# Patient Record
Sex: MALE | Race: WHITE | NOT HISPANIC OR LATINO | Employment: FULL TIME | URBAN - METROPOLITAN AREA
[De-identification: names, ages, dates, MRNs, and addresses within clinical notes are randomized per-mention and may not be internally consistent; named-entity substitution may affect disease eponyms.]

---
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
## 2021-05-09 ENCOUNTER — OFFICE VISIT (OUTPATIENT)
Dept: URGENT CARE | Facility: CLINIC | Age: 21
End: 2021-05-09
Payer: COMMERCIAL

## 2021-05-09 VITALS
BODY MASS INDEX: 24.12 KG/M2 | SYSTOLIC BLOOD PRESSURE: 130 MMHG | HEART RATE: 92 BPM | DIASTOLIC BLOOD PRESSURE: 76 MMHG | TEMPERATURE: 99 F | HEIGHT: 73 IN | RESPIRATION RATE: 18 BRPM | WEIGHT: 182 LBS | OXYGEN SATURATION: 100 %

## 2021-05-09 DIAGNOSIS — J02.0 STREP PHARYNGITIS: Primary | ICD-10-CM

## 2021-05-09 DIAGNOSIS — J02.9 SORE THROAT: ICD-10-CM

## 2021-05-09 LAB
CTP QC/QA: YES
MOLECULAR STREP A: POSITIVE

## 2021-05-09 PROCEDURE — 96372 PR INJECTION,THERAP/PROPH/DIAG2ST, IM OR SUBCUT: ICD-10-PCS | Mod: S$GLB,,, | Performed by: PHYSICIAN ASSISTANT

## 2021-05-09 PROCEDURE — 3008F BODY MASS INDEX DOCD: CPT | Mod: CPTII,S$GLB,, | Performed by: PHYSICIAN ASSISTANT

## 2021-05-09 PROCEDURE — 1125F PR PAIN SEVERITY QUANTIFIED, PAIN PRESENT: ICD-10-PCS | Mod: S$GLB,,, | Performed by: PHYSICIAN ASSISTANT

## 2021-05-09 PROCEDURE — 1125F AMNT PAIN NOTED PAIN PRSNT: CPT | Mod: S$GLB,,, | Performed by: PHYSICIAN ASSISTANT

## 2021-05-09 PROCEDURE — 96372 THER/PROPH/DIAG INJ SC/IM: CPT | Mod: S$GLB,,, | Performed by: PHYSICIAN ASSISTANT

## 2021-05-09 PROCEDURE — 87651 STREP A DNA AMP PROBE: CPT | Mod: QW,S$GLB,, | Performed by: PHYSICIAN ASSISTANT

## 2021-05-09 PROCEDURE — 3008F PR BODY MASS INDEX (BMI) DOCUMENTED: ICD-10-PCS | Mod: CPTII,S$GLB,, | Performed by: PHYSICIAN ASSISTANT

## 2021-05-09 PROCEDURE — 99214 PR OFFICE/OUTPT VISIT, EST, LEVL IV, 30-39 MIN: ICD-10-PCS | Mod: 25,S$GLB,, | Performed by: PHYSICIAN ASSISTANT

## 2021-05-09 PROCEDURE — 99214 OFFICE O/P EST MOD 30 MIN: CPT | Mod: 25,S$GLB,, | Performed by: PHYSICIAN ASSISTANT

## 2021-05-09 PROCEDURE — 87651 POCT STREP A MOLECULAR: ICD-10-PCS | Mod: QW,S$GLB,, | Performed by: PHYSICIAN ASSISTANT

## 2021-05-09 RX ORDER — AMOXICILLIN 400 MG/5ML
499 POWDER, FOR SUSPENSION ORAL 2 TIMES DAILY
Qty: 124 ML | Refills: 0 | Status: SHIPPED | OUTPATIENT
Start: 2021-05-09 | End: 2021-05-19

## 2021-05-09 RX ORDER — BETAMETHASONE SODIUM PHOSPHATE AND BETAMETHASONE ACETATE 3; 3 MG/ML; MG/ML
6 INJECTION, SUSPENSION INTRA-ARTICULAR; INTRALESIONAL; INTRAMUSCULAR; SOFT TISSUE
Status: COMPLETED | OUTPATIENT
Start: 2021-05-09 | End: 2021-05-09

## 2021-05-09 RX ADMIN — BETAMETHASONE SODIUM PHOSPHATE AND BETAMETHASONE ACETATE 6 MG: 3; 3 INJECTION, SUSPENSION INTRA-ARTICULAR; INTRALESIONAL; INTRAMUSCULAR; SOFT TISSUE at 03:05

## 2021-05-12 ENCOUNTER — TELEPHONE (OUTPATIENT)
Dept: URGENT CARE | Facility: CLINIC | Age: 21
End: 2021-05-12

## 2021-05-15 ENCOUNTER — OFFICE VISIT (OUTPATIENT)
Dept: URGENT CARE | Facility: CLINIC | Age: 21
End: 2021-05-15
Payer: COMMERCIAL

## 2021-05-15 VITALS
HEART RATE: 72 BPM | HEIGHT: 73 IN | DIASTOLIC BLOOD PRESSURE: 76 MMHG | BODY MASS INDEX: 25.84 KG/M2 | RESPIRATION RATE: 16 BRPM | WEIGHT: 195 LBS | SYSTOLIC BLOOD PRESSURE: 117 MMHG | TEMPERATURE: 99 F | OXYGEN SATURATION: 98 %

## 2021-05-15 DIAGNOSIS — J02.0 STREP PHARYNGITIS: Primary | ICD-10-CM

## 2021-05-15 PROCEDURE — 3008F PR BODY MASS INDEX (BMI) DOCUMENTED: ICD-10-PCS | Mod: CPTII,S$GLB,, | Performed by: PHYSICIAN ASSISTANT

## 2021-05-15 PROCEDURE — 99214 OFFICE O/P EST MOD 30 MIN: CPT | Mod: S$GLB,,, | Performed by: PHYSICIAN ASSISTANT

## 2021-05-15 PROCEDURE — 99214 PR OFFICE/OUTPT VISIT, EST, LEVL IV, 30-39 MIN: ICD-10-PCS | Mod: S$GLB,,, | Performed by: PHYSICIAN ASSISTANT

## 2021-05-15 PROCEDURE — 3008F BODY MASS INDEX DOCD: CPT | Mod: CPTII,S$GLB,, | Performed by: PHYSICIAN ASSISTANT

## 2021-05-15 RX ORDER — AMOXICILLIN 400 MG/5ML
800 POWDER, FOR SUSPENSION ORAL 2 TIMES DAILY
Qty: 140 ML | Refills: 0 | Status: SHIPPED | OUTPATIENT
Start: 2021-05-15 | End: 2021-05-22

## 2021-05-15 RX ORDER — PREDNISONE 20 MG/1
20 TABLET ORAL 2 TIMES DAILY
Qty: 4 TABLET | Refills: 0 | Status: SHIPPED | OUTPATIENT
Start: 2021-05-15 | End: 2021-05-17

## 2021-05-18 ENCOUNTER — TELEPHONE (OUTPATIENT)
Dept: URGENT CARE | Facility: CLINIC | Age: 21
End: 2021-05-18

## 2021-05-25 ENCOUNTER — OFFICE VISIT (OUTPATIENT)
Dept: FAMILY MEDICINE | Facility: CLINIC | Age: 21
End: 2021-05-25
Payer: COMMERCIAL

## 2021-05-25 VITALS
TEMPERATURE: 98 F | HEART RATE: 92 BPM | OXYGEN SATURATION: 96 % | HEIGHT: 73 IN | RESPIRATION RATE: 16 BRPM | SYSTOLIC BLOOD PRESSURE: 110 MMHG | WEIGHT: 183.56 LBS | DIASTOLIC BLOOD PRESSURE: 74 MMHG | BODY MASS INDEX: 24.33 KG/M2

## 2021-05-25 DIAGNOSIS — R53.83 FATIGUE, UNSPECIFIED TYPE: Primary | ICD-10-CM

## 2021-05-25 DIAGNOSIS — J02.9 SORE THROAT: ICD-10-CM

## 2021-05-25 DIAGNOSIS — B35.4 TINEA CORPORIS: ICD-10-CM

## 2021-05-25 LAB
CTP QC/QA: YES
CTP QC/QA: YES
HETEROPH AB SER QL: NEGATIVE
S PYO RRNA THROAT QL PROBE: NEGATIVE

## 2021-05-25 PROCEDURE — 99214 OFFICE O/P EST MOD 30 MIN: CPT | Mod: 25,S$GLB,, | Performed by: NURSE PRACTITIONER

## 2021-05-25 PROCEDURE — 1126F AMNT PAIN NOTED NONE PRSNT: CPT | Mod: S$GLB,,, | Performed by: NURSE PRACTITIONER

## 2021-05-25 PROCEDURE — 3008F BODY MASS INDEX DOCD: CPT | Mod: CPTII,S$GLB,, | Performed by: NURSE PRACTITIONER

## 2021-05-25 PROCEDURE — 87880 POCT RAPID STREP A: ICD-10-PCS | Mod: QW,,, | Performed by: NURSE PRACTITIONER

## 2021-05-25 PROCEDURE — 99214 PR OFFICE/OUTPT VISIT, EST, LEVL IV, 30-39 MIN: ICD-10-PCS | Mod: 25,S$GLB,, | Performed by: NURSE PRACTITIONER

## 2021-05-25 PROCEDURE — 1126F PR PAIN SEVERITY QUANTIFIED, NO PAIN PRESENT: ICD-10-PCS | Mod: S$GLB,,, | Performed by: NURSE PRACTITIONER

## 2021-05-25 PROCEDURE — 87880 STREP A ASSAY W/OPTIC: CPT | Mod: QW,,, | Performed by: NURSE PRACTITIONER

## 2021-05-25 PROCEDURE — 86308 POCT INFECTIOUS MONONUCLEOSIS: ICD-10-PCS | Mod: QW,,, | Performed by: NURSE PRACTITIONER

## 2021-05-25 PROCEDURE — 86308 HETEROPHILE ANTIBODY SCREEN: CPT | Mod: QW,,, | Performed by: NURSE PRACTITIONER

## 2021-05-25 PROCEDURE — 3008F PR BODY MASS INDEX (BMI) DOCUMENTED: ICD-10-PCS | Mod: CPTII,S$GLB,, | Performed by: NURSE PRACTITIONER

## 2021-05-25 RX ORDER — TERBINAFINE HYDROCHLORIDE 250 MG/1
250 TABLET ORAL DAILY
Qty: 21 TABLET | Refills: 0 | Status: SHIPPED | OUTPATIENT
Start: 2021-05-25 | End: 2021-06-24

## 2021-05-25 RX ORDER — AMOXICILLIN AND CLAVULANATE POTASSIUM 875; 125 MG/1; MG/1
1 TABLET, FILM COATED ORAL 2 TIMES DAILY
COMMUNITY
Start: 2021-02-09 | End: 2022-09-22

## 2021-06-22 ENCOUNTER — PATIENT OUTREACH (OUTPATIENT)
Dept: ADMINISTRATIVE | Facility: OTHER | Age: 21
End: 2021-06-22

## 2022-01-31 ENCOUNTER — TELEPHONE (OUTPATIENT)
Dept: FAMILY MEDICINE | Facility: CLINIC | Age: 22
End: 2022-01-31
Payer: COMMERCIAL

## 2022-01-31 RX ORDER — NEOMYCIN/POLYMYXIN B/HYDROCORT 3.5-10K-1
1 SUSPENSION, DROPS(FINAL DOSAGE FORM)(ML) OPHTHALMIC (EYE) EVERY 4 HOURS
Qty: 7.5 ML | Refills: 0 | Status: SHIPPED | OUTPATIENT
Start: 2022-01-31 | End: 2022-09-22

## 2022-01-31 NOTE — TELEPHONE ENCOUNTER
----- Message from Fox EARL Maria Ines sent at 1/31/2022  1:26 PM CST -----  Contact: patient  Type: Needs Medical Advice  Who Called:  patient  Symptoms (please be specific):  possible pink eye  How long has patient had these symptoms:  1 day  Pharmacy name and phone #:    [facility] #[id number] - [address] AT [facility] [address] & [facility] [address] [place] [id number]  Phone: [phone] Fax: [phone]    Best Call Back Number: [phone]  Additional Information: Patient called in and stated his dad told him to call to see if Radha would call some drops in for his pink eye.  Patient stated he woke up this morning and his eye is all red & was crusted over.

## 2022-02-14 ENCOUNTER — OCCUPATIONAL HEALTH (OUTPATIENT)
Dept: URGENT CARE | Facility: CLINIC | Age: 22
End: 2022-02-14

## 2022-02-14 VITALS
HEART RATE: 81 BPM | DIASTOLIC BLOOD PRESSURE: 69 MMHG | WEIGHT: 187 LBS | SYSTOLIC BLOOD PRESSURE: 133 MMHG | HEIGHT: 72 IN | BODY MASS INDEX: 25.33 KG/M2 | TEMPERATURE: 98 F | RESPIRATION RATE: 16 BRPM

## 2022-02-14 DIAGNOSIS — Z02.1 PRE-EMPLOYMENT EXAMINATION: ICD-10-CM

## 2022-02-14 DIAGNOSIS — Z02.83 ENCOUNTER FOR DRUG SCREENING: Primary | ICD-10-CM

## 2022-02-14 PROCEDURE — 80305 OOH NON-DOT DRUG SCREEN: ICD-10-PCS | Mod: S$GLB,,, | Performed by: FAMILY MEDICINE

## 2022-02-14 PROCEDURE — 99499 UNLISTED E&M SERVICE: CPT | Mod: S$GLB,,, | Performed by: FAMILY MEDICINE

## 2022-02-14 PROCEDURE — 99499 PHYSICAL, BASIC COMPLEXITY: ICD-10-PCS | Mod: S$GLB,,, | Performed by: FAMILY MEDICINE

## 2022-02-14 PROCEDURE — 80305 DRUG TEST PRSMV DIR OPT OBS: CPT | Mod: S$GLB,,, | Performed by: FAMILY MEDICINE

## 2022-08-28 ENCOUNTER — OFFICE VISIT (OUTPATIENT)
Dept: URGENT CARE | Facility: CLINIC | Age: 22
End: 2022-08-28
Payer: COMMERCIAL

## 2022-08-28 VITALS
BODY MASS INDEX: 23.86 KG/M2 | RESPIRATION RATE: 18 BRPM | WEIGHT: 180 LBS | HEIGHT: 73 IN | DIASTOLIC BLOOD PRESSURE: 64 MMHG | HEART RATE: 71 BPM | SYSTOLIC BLOOD PRESSURE: 112 MMHG | TEMPERATURE: 98 F | OXYGEN SATURATION: 98 %

## 2022-08-28 DIAGNOSIS — R10.32 DISCOMFORT OF LEFT GROIN: Primary | ICD-10-CM

## 2022-08-28 PROCEDURE — 3078F DIAST BP <80 MM HG: CPT | Mod: CPTII,S$GLB,, | Performed by: PHYSICIAN ASSISTANT

## 2022-08-28 PROCEDURE — 1160F PR REVIEW ALL MEDS BY PRESCRIBER/CLIN PHARMACIST DOCUMENTED: ICD-10-PCS | Mod: CPTII,S$GLB,, | Performed by: PHYSICIAN ASSISTANT

## 2022-08-28 PROCEDURE — 3008F BODY MASS INDEX DOCD: CPT | Mod: CPTII,S$GLB,, | Performed by: PHYSICIAN ASSISTANT

## 2022-08-28 PROCEDURE — 1159F MED LIST DOCD IN RCRD: CPT | Mod: CPTII,S$GLB,, | Performed by: PHYSICIAN ASSISTANT

## 2022-08-28 PROCEDURE — 3074F SYST BP LT 130 MM HG: CPT | Mod: CPTII,S$GLB,, | Performed by: PHYSICIAN ASSISTANT

## 2022-08-28 PROCEDURE — 3078F PR MOST RECENT DIASTOLIC BLOOD PRESSURE < 80 MM HG: ICD-10-PCS | Mod: CPTII,S$GLB,, | Performed by: PHYSICIAN ASSISTANT

## 2022-08-28 PROCEDURE — 1159F PR MEDICATION LIST DOCUMENTED IN MEDICAL RECORD: ICD-10-PCS | Mod: CPTII,S$GLB,, | Performed by: PHYSICIAN ASSISTANT

## 2022-08-28 PROCEDURE — 3074F PR MOST RECENT SYSTOLIC BLOOD PRESSURE < 130 MM HG: ICD-10-PCS | Mod: CPTII,S$GLB,, | Performed by: PHYSICIAN ASSISTANT

## 2022-08-28 PROCEDURE — 99213 PR OFFICE/OUTPT VISIT, EST, LEVL III, 20-29 MIN: ICD-10-PCS | Mod: S$GLB,,, | Performed by: PHYSICIAN ASSISTANT

## 2022-08-28 PROCEDURE — 99213 OFFICE O/P EST LOW 20 MIN: CPT | Mod: S$GLB,,, | Performed by: PHYSICIAN ASSISTANT

## 2022-08-28 PROCEDURE — 3008F PR BODY MASS INDEX (BMI) DOCUMENTED: ICD-10-PCS | Mod: CPTII,S$GLB,, | Performed by: PHYSICIAN ASSISTANT

## 2022-08-28 PROCEDURE — 1160F RVW MEDS BY RX/DR IN RCRD: CPT | Mod: CPTII,S$GLB,, | Performed by: PHYSICIAN ASSISTANT

## 2022-08-28 NOTE — PROGRESS NOTES
"Subjective:       Patient ID: Adriano Johnson is a 21 y.o. male.    Vitals:  height is 6' 1" (1.854 m) and weight is 81.6 kg (180 lb). His oral temperature is 98.2 °F (36.8 °C). His blood pressure is 112/64 and his pulse is 71. His respiration is 18 and oxygen saturation is 98%.     Chief Complaint: Mass    Patient presents to urgent care with possible area of concern around L groin/L buttocks region. Patient has been taking OTC Advil with temporary relief.    Mass  This is a new problem. The current episode started in the past 7 days (1 week). The problem occurs constantly. The problem has been unchanged. Pertinent negatives include no abdominal pain, anorexia, arthralgias, change in bowel habit, chest pain, chills, congestion, coughing, diaphoresis, fatigue, fever, headaches, joint swelling, myalgias, nausea, neck pain, numbness, rash, sore throat, swollen glands, urinary symptoms, vertigo, visual change, vomiting or weakness. Nothing aggravates the symptoms. He has tried NSAIDs for the symptoms. The treatment provided mild relief.     Constitution: Negative for chills, sweating, fatigue and fever.   HENT:  Negative for ear pain, drooling, congestion, sore throat, trouble swallowing and voice change.    Neck: Negative for neck pain, neck stiffness, painful lymph nodes and neck swelling.   Cardiovascular:  Negative for chest pain, leg swelling, palpitations, sob on exertion and passing out.   Eyes:  Negative for eye discharge, eye itching, eye pain, eye redness and eyelid swelling.   Respiratory:  Negative for chest tightness, cough, sputum production, bloody sputum, shortness of breath, stridor and wheezing.    Gastrointestinal:  Negative for abdominal pain, abdominal bloating, nausea, vomiting, constipation, diarrhea and heartburn.   Genitourinary:  Negative for urine decreased.   Musculoskeletal:  Negative for joint pain, joint swelling, abnormal ROM of joint, pain with walking, muscle cramps and muscle ache. "   Skin:  Positive for lesion. Negative for rash, erythema and hives.   Allergic/Immunologic: Negative for hives, itching and sneezing.   Neurological:  Negative for dizziness, history of vertigo, light-headedness, passing out, loss of balance, headaches, altered mental status, loss of consciousness, numbness and seizures.   Hematologic/Lymphatic: Negative for swollen lymph nodes.   Psychiatric/Behavioral:  Negative for altered mental status and nervous/anxious. The patient is not nervous/anxious.      Objective:      Physical Exam   Constitutional: He is oriented to person, place, and time. He appears well-developed. He is cooperative.  Non-toxic appearance. He does not appear ill. No distress.   HENT:   Head: Normocephalic and atraumatic.   Ears:   Right Ear: Hearing, tympanic membrane, external ear and ear canal normal.   Left Ear: Hearing, tympanic membrane, external ear and ear canal normal.   Nose: Nose normal. No mucosal edema, rhinorrhea or nasal deformity. No epistaxis. Right sinus exhibits no maxillary sinus tenderness and no frontal sinus tenderness. Left sinus exhibits no maxillary sinus tenderness and no frontal sinus tenderness.   Mouth/Throat: Uvula is midline, oropharynx is clear and moist and mucous membranes are normal. No trismus in the jaw. Normal dentition. No uvula swelling. No oropharyngeal exudate, posterior oropharyngeal edema or posterior oropharyngeal erythema.   Eyes: Conjunctivae and lids are normal. No scleral icterus.   Neck: Trachea normal and phonation normal. Neck supple. No edema present. No erythema present. No neck rigidity present.   Cardiovascular: Normal rate, regular rhythm, normal heart sounds and normal pulses.   Pulmonary/Chest: Effort normal and breath sounds normal. No respiratory distress. He has no decreased breath sounds. He has no rhonchi.   Abdominal: Normal appearance. Hernia confirmed negative in the left inguinal area.   Genitourinary:    Testes and penis normal.            Musculoskeletal: Normal range of motion.         General: No deformity. Normal range of motion.   Neurological: He is alert and oriented to person, place, and time. He has normal sensation. He exhibits normal muscle tone. Gait normal. Coordination normal.   Skin: Skin is warm, dry, intact, not diaphoretic, not pale, no rash, not vesicular and no abscessed. Capillary refill takes less than 2 seconds. No abrasion, No burn, No bruising, No erythema, No ecchymosis and No petechiae   Psychiatric: His speech is normal and behavior is normal. Judgment and thought content normal.   Nursing note and vitals reviewed.chaperone present (PA on site)         Assessment:       1. Discomfort of left groin          Plan:     Discussed the limitations in the urgent care setting. Advised close follow-up with PCP and/or Specialist for further evaluation as needed. ER precautions given to patient as well. Patient aware, verbalized understanding and agreed with plan of care.    Discomfort of left groin       There are no Patient Instructions on file for this visit.

## 2022-09-22 ENCOUNTER — OFFICE VISIT (OUTPATIENT)
Dept: FAMILY MEDICINE | Facility: CLINIC | Age: 22
End: 2022-09-22
Payer: MEDICAID

## 2022-09-22 VITALS
HEIGHT: 73 IN | HEART RATE: 71 BPM | OXYGEN SATURATION: 99 % | TEMPERATURE: 98 F | DIASTOLIC BLOOD PRESSURE: 76 MMHG | RESPIRATION RATE: 16 BRPM | WEIGHT: 185.44 LBS | SYSTOLIC BLOOD PRESSURE: 116 MMHG | BODY MASS INDEX: 24.58 KG/M2

## 2022-09-22 DIAGNOSIS — R30.0 DYSURIA: ICD-10-CM

## 2022-09-22 DIAGNOSIS — Z00.00 ANNUAL PHYSICAL EXAM: Primary | ICD-10-CM

## 2022-09-22 DIAGNOSIS — R19.09 LEFT GROIN MASS: ICD-10-CM

## 2022-09-22 DIAGNOSIS — R53.83 FATIGUE, UNSPECIFIED TYPE: ICD-10-CM

## 2022-09-22 DIAGNOSIS — Z11.8 SCREENING FOR CHLAMYDIAL DISEASE: ICD-10-CM

## 2022-09-22 DIAGNOSIS — Z00.00 LABORATORY EXAM ORDERED AS PART OF ROUTINE GENERAL MEDICAL EXAMINATION: ICD-10-CM

## 2022-09-22 DIAGNOSIS — R31.9 HEMATURIA, UNSPECIFIED TYPE: ICD-10-CM

## 2022-09-22 PROBLEM — J45.990 EXERCISE-INDUCED ASTHMA: Status: ACTIVE | Noted: 2022-09-22

## 2022-09-22 PROBLEM — K80.20 GALLSTONE: Status: ACTIVE | Noted: 2022-09-22

## 2022-09-22 LAB
ALBUMIN SERPL BCP-MCNC: 4.4 G/DL (ref 3.5–5.2)
ALP SERPL-CCNC: 89 U/L (ref 55–135)
ALT SERPL W/O P-5'-P-CCNC: 27 U/L (ref 10–44)
ANION GAP SERPL CALC-SCNC: 10 MMOL/L (ref 8–16)
AST SERPL-CCNC: 18 U/L (ref 10–40)
BASOPHILS # BLD AUTO: 0.05 K/UL (ref 0–0.2)
BASOPHILS NFR BLD: 0.5 % (ref 0–1.9)
BILIRUB SERPL-MCNC: 0.6 MG/DL (ref 0.1–1)
BILIRUB SERPL-MCNC: NEGATIVE MG/DL
BLOOD URINE, POC: ABNORMAL
BUN SERPL-MCNC: 12 MG/DL (ref 6–20)
CALCIUM SERPL-MCNC: 9.1 MG/DL (ref 8.7–10.5)
CHLORIDE SERPL-SCNC: 106 MMOL/L (ref 95–110)
CHOLEST SERPL-MCNC: 114 MG/DL (ref 120–199)
CHOLEST/HDLC SERPL: 2.4 {RATIO} (ref 2–5)
CLARITY, POC UA: CLEAR
CO2 SERPL-SCNC: 23 MMOL/L (ref 23–29)
COLOR, POC UA: YELLOW
CREAT SERPL-MCNC: 1 MG/DL (ref 0.5–1.4)
DIFFERENTIAL METHOD: NORMAL
EOSINOPHIL # BLD AUTO: 0.2 K/UL (ref 0–0.5)
EOSINOPHIL NFR BLD: 2.4 % (ref 0–8)
ERYTHROCYTE [DISTWIDTH] IN BLOOD BY AUTOMATED COUNT: 12.3 % (ref 11.5–14.5)
EST. GFR  (NO RACE VARIABLE): >60 ML/MIN/1.73 M^2
ESTIMATED AVG GLUCOSE: 91 MG/DL (ref 68–131)
GLUCOSE SERPL-MCNC: 85 MG/DL (ref 70–110)
GLUCOSE UR QL STRIP: NORMAL
HBA1C MFR BLD: 4.8 % (ref 4–5.6)
HCT VFR BLD AUTO: 48 % (ref 40–54)
HDLC SERPL-MCNC: 47 MG/DL (ref 40–75)
HDLC SERPL: 41.2 % (ref 20–50)
HGB BLD-MCNC: 16 G/DL (ref 14–18)
IMM GRANULOCYTES # BLD AUTO: 0.03 K/UL (ref 0–0.04)
IMM GRANULOCYTES NFR BLD AUTO: 0.3 % (ref 0–0.5)
IRON SERPL-MCNC: 93 UG/DL (ref 45–160)
KETONES UR QL STRIP: NEGATIVE
LDLC SERPL CALC-MCNC: 56.6 MG/DL (ref 63–159)
LEUKOCYTE ESTERASE URINE, POC: NEGATIVE
LYMPHOCYTES # BLD AUTO: 2.8 K/UL (ref 1–4.8)
LYMPHOCYTES NFR BLD: 27.8 % (ref 18–48)
MCH RBC QN AUTO: 29.9 PG (ref 27–31)
MCHC RBC AUTO-ENTMCNC: 33.3 G/DL (ref 32–36)
MCV RBC AUTO: 90 FL (ref 82–98)
MONOCYTES # BLD AUTO: 0.8 K/UL (ref 0.3–1)
MONOCYTES NFR BLD: 8 % (ref 4–15)
NEUTROPHILS # BLD AUTO: 6.2 K/UL (ref 1.8–7.7)
NEUTROPHILS NFR BLD: 61 % (ref 38–73)
NITRITE, POC UA: NEGATIVE
NONHDLC SERPL-MCNC: 67 MG/DL
NRBC BLD-RTO: 0 /100 WBC
PH, POC UA: 5
PLATELET # BLD AUTO: 259 K/UL (ref 150–450)
PMV BLD AUTO: 9.9 FL (ref 9.2–12.9)
POTASSIUM SERPL-SCNC: 4.2 MMOL/L (ref 3.5–5.1)
PROT SERPL-MCNC: 6.7 G/DL (ref 6–8.4)
PROTEIN, POC: ABNORMAL
RBC # BLD AUTO: 5.35 M/UL (ref 4.6–6.2)
SATURATED IRON: 24 % (ref 20–50)
SODIUM SERPL-SCNC: 139 MMOL/L (ref 136–145)
SPECIFIC GRAVITY, POC UA: 1.02
TOTAL IRON BINDING CAPACITY: 385 UG/DL (ref 250–450)
TRANSFERRIN SERPL-MCNC: 260 MG/DL (ref 200–375)
TRIGL SERPL-MCNC: 52 MG/DL (ref 30–150)
UROBILINOGEN, POC UA: NORMAL
WBC # BLD AUTO: 10.09 K/UL (ref 3.9–12.7)

## 2022-09-22 PROCEDURE — 81002 URINALYSIS NONAUTO W/O SCOPE: CPT | Mod: S$GLB,,, | Performed by: NURSE PRACTITIONER

## 2022-09-22 PROCEDURE — 3074F PR MOST RECENT SYSTOLIC BLOOD PRESSURE < 130 MM HG: ICD-10-PCS | Mod: CPTII,S$GLB,, | Performed by: NURSE PRACTITIONER

## 2022-09-22 PROCEDURE — 3074F SYST BP LT 130 MM HG: CPT | Mod: CPTII,S$GLB,, | Performed by: NURSE PRACTITIONER

## 2022-09-22 PROCEDURE — 1159F PR MEDICATION LIST DOCUMENTED IN MEDICAL RECORD: ICD-10-PCS | Mod: CPTII,S$GLB,, | Performed by: NURSE PRACTITIONER

## 2022-09-22 PROCEDURE — 3078F PR MOST RECENT DIASTOLIC BLOOD PRESSURE < 80 MM HG: ICD-10-PCS | Mod: CPTII,S$GLB,, | Performed by: NURSE PRACTITIONER

## 2022-09-22 PROCEDURE — 1159F MED LIST DOCD IN RCRD: CPT | Mod: CPTII,S$GLB,, | Performed by: NURSE PRACTITIONER

## 2022-09-22 PROCEDURE — 36415 COLL VENOUS BLD VENIPUNCTURE: CPT | Mod: S$GLB,,, | Performed by: NURSE PRACTITIONER

## 2022-09-22 PROCEDURE — 3078F DIAST BP <80 MM HG: CPT | Mod: CPTII,S$GLB,, | Performed by: NURSE PRACTITIONER

## 2022-09-22 PROCEDURE — 84443 ASSAY THYROID STIM HORMONE: CPT | Performed by: NURSE PRACTITIONER

## 2022-09-22 PROCEDURE — 36415 PR COLLECTION VENOUS BLOOD,VENIPUNCTURE: ICD-10-PCS | Mod: S$GLB,,, | Performed by: NURSE PRACTITIONER

## 2022-09-22 PROCEDURE — 80053 COMPREHEN METABOLIC PANEL: CPT | Performed by: NURSE PRACTITIONER

## 2022-09-22 PROCEDURE — 85025 COMPLETE CBC W/AUTO DIFF WBC: CPT | Performed by: NURSE PRACTITIONER

## 2022-09-22 PROCEDURE — 1160F PR REVIEW ALL MEDS BY PRESCRIBER/CLIN PHARMACIST DOCUMENTED: ICD-10-PCS | Mod: CPTII,S$GLB,, | Performed by: NURSE PRACTITIONER

## 2022-09-22 PROCEDURE — 99395 PR PREVENTIVE VISIT,EST,18-39: ICD-10-PCS | Mod: S$GLB,,, | Performed by: NURSE PRACTITIONER

## 2022-09-22 PROCEDURE — 3008F BODY MASS INDEX DOCD: CPT | Mod: CPTII,S$GLB,, | Performed by: NURSE PRACTITIONER

## 2022-09-22 PROCEDURE — 3008F PR BODY MASS INDEX (BMI) DOCUMENTED: ICD-10-PCS | Mod: CPTII,S$GLB,, | Performed by: NURSE PRACTITIONER

## 2022-09-22 PROCEDURE — 3044F HG A1C LEVEL LT 7.0%: CPT | Mod: CPTII,S$GLB,, | Performed by: NURSE PRACTITIONER

## 2022-09-22 PROCEDURE — 99395 PREV VISIT EST AGE 18-39: CPT | Mod: S$GLB,,, | Performed by: NURSE PRACTITIONER

## 2022-09-22 PROCEDURE — 87591 N.GONORRHOEAE DNA AMP PROB: CPT | Performed by: NURSE PRACTITIONER

## 2022-09-22 PROCEDURE — 81002 POCT URINE DIPSTICK WITHOUT MICROSCOPE: ICD-10-PCS | Mod: S$GLB,,, | Performed by: NURSE PRACTITIONER

## 2022-09-22 PROCEDURE — 80061 LIPID PANEL: CPT | Performed by: NURSE PRACTITIONER

## 2022-09-22 PROCEDURE — 87491 CHLMYD TRACH DNA AMP PROBE: CPT | Performed by: NURSE PRACTITIONER

## 2022-09-22 PROCEDURE — 1160F RVW MEDS BY RX/DR IN RCRD: CPT | Mod: CPTII,S$GLB,, | Performed by: NURSE PRACTITIONER

## 2022-09-22 PROCEDURE — 87086 URINE CULTURE/COLONY COUNT: CPT | Performed by: NURSE PRACTITIONER

## 2022-09-22 PROCEDURE — 83036 HEMOGLOBIN GLYCOSYLATED A1C: CPT | Performed by: NURSE PRACTITIONER

## 2022-09-22 PROCEDURE — 3044F PR MOST RECENT HEMOGLOBIN A1C LEVEL <7.0%: ICD-10-PCS | Mod: CPTII,S$GLB,, | Performed by: NURSE PRACTITIONER

## 2022-09-22 PROCEDURE — 84466 ASSAY OF TRANSFERRIN: CPT | Performed by: NURSE PRACTITIONER

## 2022-09-22 RX ORDER — DOXYCYCLINE 100 MG/1
100 CAPSULE ORAL 2 TIMES DAILY
Qty: 14 CAPSULE | Refills: 0 | Status: SHIPPED | OUTPATIENT
Start: 2022-09-22 | End: 2023-03-10 | Stop reason: ALTCHOICE

## 2022-09-22 RX ORDER — PHENAZOPYRIDINE HYDROCHLORIDE 100 MG/1
100 TABLET, FILM COATED ORAL 3 TIMES DAILY PRN
Qty: 10 TABLET | Refills: 0 | Status: SHIPPED | OUTPATIENT
Start: 2022-09-22 | End: 2022-10-02

## 2022-09-22 RX ORDER — PREDNISOLONE ACETATE 10 MG/ML
1 SUSPENSION/ DROPS OPHTHALMIC 2 TIMES DAILY
COMMUNITY
Start: 2022-09-07

## 2022-09-22 NOTE — PROGRESS NOTES
Subjective:       Patient ID: Adriano Johnson is a 22 y.o. male.    Chief Complaint: Annual exam    HPI Here for annual exam. States has concerns for two days of burning with urination. Denies any fever or flank pain. Denies any penile drip. He is sexually active. Also concerned about a round lump to inner left groin area. States area is not tender. He would like to have routine labs done. Having some fatigue. Does not know what his family history is, he is adopted. See ROS    The following portion of the patients history was reviewed and updated as appropriate: allergies, current medications, past medical and surgical history. Past social history and problem list reviewed. Family PMH and Past social history reviewed. Tobacco, Illicit drug use reviewed.      Review of patient's allergies indicates:  No Known Allergies      Current Outpatient Medications:     EPIDUO FORTE 0.3-2.5 % GlwP, AAA face qhs, Disp: 45 g, Rfl: 3    ibuprofen (ADVIL,MOTRIN) 200 MG tablet, Take 200 mg by mouth every 6 (six) hours as needed for Pain., Disp: , Rfl:     prednisoLONE acetate (PRED FORTE) 1 % DrpS, Place 1 drop into the left eye 2 (two) times daily., Disp: , Rfl:     Current Facility-Administered Medications:     lidocaine (PF) 10 mg/ml (1%) injection 10 mg, 1 mL, Intradermal, Once, Jc Dobson MD    Past Medical History:   Diagnosis Date    Kidney stone     Symptomatic cholelithiasis 05/2019       Past Surgical History:   Procedure Laterality Date    APPENDECTOMY  02/20/2018    MD [facility]--[facility]     CHOLECYSTECTOMY      LAPAROSCOPIC CHOLECYSTECTOMY N/A 5/27/2019    Procedure: CHOLECYSTECTOMY, LAPAROSCOPIC;  Surgeon: Jc Dobson MD;  Location: [facility];  Service: General;  Laterality: N/A;       Social History     Socioeconomic History    Marital status: Single   Tobacco Use    Smoking status: Never    Smokeless tobacco: Never   Substance and Sexual Activity    Alcohol use: No     Alcohol/week: 0.0 standard drinks    Drug use:  "No   Social History Narrative    Adopted in 2007 at age 7 (was fostered first)    Lives at home with both adopted parents, did have smoking exposure when younger (first 4 years)    No pets      Review of Systems   Constitutional:  Negative for activity change, appetite change, fatigue and fever.   HENT:  Negative for congestion, rhinorrhea and trouble swallowing.    Eyes:  Negative for visual disturbance.   Respiratory:  Negative for cough, chest tightness, shortness of breath and wheezing.    Cardiovascular:  Negative for chest pain, palpitations and leg swelling.   Gastrointestinal:  Negative for abdominal pain, blood in stool, diarrhea, nausea and vomiting.   Genitourinary:  Positive for dysuria. Negative for decreased urine volume, difficulty urinating, penile discharge and penile pain.   Musculoskeletal:  Negative for arthralgias, back pain and gait problem.   Skin:  Negative for rash.        Lump inner left groin/thigh area   Neurological:  Negative for dizziness, weakness and headaches.   Hematological:  Negative for adenopathy. Does not bruise/bleed easily.   Psychiatric/Behavioral:  Negative for decreased concentration, dysphoric mood and sleep disturbance. The patient is not nervous/anxious.      Objective:      /76   Pulse 71   Temp 98.2 °F (36.8 °C) (Temporal)   Resp 16   Ht 6' 1" (1.854 m)   Wt 84.1 kg (185 lb 6.5 oz)   SpO2 99%   BMI 24.46 kg/m²      Physical Exam  Vitals and nursing note reviewed.   Constitutional:       General: He is not in acute distress.     Appearance: Normal appearance. He is well-developed. He is not diaphoretic.   HENT:      Head: Normocephalic and atraumatic.      Mouth/Throat:      Mouth: Mucous membranes are moist.      Pharynx: Oropharynx is clear. No oropharyngeal exudate.   Eyes:      General:         Right eye: No discharge.         Left eye: No discharge.      Conjunctiva/sclera: Conjunctivae normal.      Pupils: Pupils are equal, round, and reactive to " light.   Neck:      Thyroid: No thyromegaly.      Vascular: No carotid bruit or JVD.   Cardiovascular:      Rate and Rhythm: Normal rate and regular rhythm.      Pulses: Normal pulses.           Carotid pulses are 2+ on the right side and 2+ on the left side.       Radial pulses are 2+ on the right side and 2+ on the left side.      Heart sounds: Normal heart sounds. No murmur heard.    No gallop.   Pulmonary:      Effort: Pulmonary effort is normal. No respiratory distress.      Breath sounds: Normal breath sounds. No wheezing or rales.   Chest:      Chest wall: No tenderness.   Abdominal:      General: Bowel sounds are normal. There is no distension.      Palpations: Abdomen is soft.      Tenderness: There is no abdominal tenderness. There is no guarding or rebound.   Musculoskeletal:         General: Normal range of motion.      Cervical back: Full passive range of motion without pain, normal range of motion and neck supple.      Right lower leg: No edema.      Left lower leg: No edema.        Legs:       Comments: Gait and coordination normal.  strong, equal. Upper and lower extremity strength normal.    Lymphadenopathy:      Cervical: No cervical adenopathy.   Skin:     General: Skin is warm and dry.      Capillary Refill: Capillary refill takes less than 2 seconds.      Findings: No rash.   Neurological:      General: No focal deficit present.      Mental Status: He is alert and oriented to person, place, and time.   Psychiatric:         Attention and Perception: Attention and perception normal.         Mood and Affect: Mood and affect normal.         Speech: Speech normal.         Behavior: Behavior normal.         Thought Content: Thought content normal.         Judgment: Judgment normal.       Assessment:       1. Annual physical exam    2. Dysuria    3. Hematuria, unspecified type    4. Screening for chlamydial disease    5. Laboratory exam ordered as part of routine general medical examination    6.  Fatigue, unspecified type    7. Left groin mass        Plan:       Annual physical exam    Dysuria: trace protein, trace blood.   -     POCT URINE DIPSTICK WITHOUT MICROSCOPE    Hematuria, unspecified type: send urine for culture.  -     Urine culture    Screening for chlamydial disease: most likely cause of symptoms is chlamydia. Will start on Doxycycline.  -     C. trachomatis/N. gonorrhoeae by AMP DNA; Future; Expected date: 09/22/2022    Laboratory exam ordered as part of routine general medical examination  -     CBC Auto Differential  -     Comprehensive Metabolic Panel  -     Lipid Panel  -     Hemoglobin A1C    Fatigue, unspecified type: schedule labs.  -     TSH  -     Iron and TIBC    Left groin mass: will get US.   -     US Soft Tissue, Groin Left; Future; Expected date: 09/22/2022    Other orders  -     doxycycline (VIBRAMYCIN) 100 MG Cap; Take 1 capsule (100 mg total) by mouth 2 (two) times daily.  Dispense: 14 capsule; Refill: 0  -     phenazopyridine (PYRIDIUM) 100 MG tablet; Take 1 tablet (100 mg total) by mouth 3 (three) times daily as needed for Pain.  Dispense: 10 tablet; Refill: 0     Continue current medication  Take medications only as prescribed  Healthy diet, exercise  Adequate rest  Adequate hydration  Avoid allergens  Avoid excessive caffeine      Follow up after testing completed, if not improving

## 2022-09-23 LAB
C TRACH DNA SPEC QL NAA+PROBE: DETECTED
N GONORRHOEA DNA SPEC QL NAA+PROBE: NOT DETECTED
TSH SERPL DL<=0.005 MIU/L-ACNC: 3.09 UIU/ML (ref 0.4–4)

## 2022-09-24 LAB — BACTERIA UR CULT: NO GROWTH

## 2023-03-10 ENCOUNTER — OFFICE VISIT (OUTPATIENT)
Dept: FAMILY MEDICINE | Facility: CLINIC | Age: 23
End: 2023-03-10
Payer: COMMERCIAL

## 2023-03-10 VITALS
OXYGEN SATURATION: 97 % | DIASTOLIC BLOOD PRESSURE: 78 MMHG | HEART RATE: 83 BPM | SYSTOLIC BLOOD PRESSURE: 132 MMHG | TEMPERATURE: 97 F | WEIGHT: 188.38 LBS | HEIGHT: 73 IN | RESPIRATION RATE: 18 BRPM | BODY MASS INDEX: 24.97 KG/M2

## 2023-03-10 DIAGNOSIS — R10.9 FLANK PAIN: Primary | ICD-10-CM

## 2023-03-10 DIAGNOSIS — R31.9 HEMATURIA, UNSPECIFIED TYPE: ICD-10-CM

## 2023-03-10 DIAGNOSIS — N20.0 RENAL CALCULI: ICD-10-CM

## 2023-03-10 PROBLEM — R10.31 ABDOMINAL PAIN, RLQ (RIGHT LOWER QUADRANT): Status: RESOLVED | Noted: 2018-02-20 | Resolved: 2023-03-10

## 2023-03-10 LAB
BILIRUBIN, UA POC OHS: NEGATIVE
BLOOD, UA POC OHS: ABNORMAL
CLARITY, UA POC OHS: CLEAR
COLOR, UA POC OHS: YELLOW
GLUCOSE, UA POC OHS: NEGATIVE
KETONES, UA POC OHS: NEGATIVE
LEUKOCYTES, UA POC OHS: NEGATIVE
NITRITE, UA POC OHS: NEGATIVE
PH, UA POC OHS: 6.5
PROTEIN, UA POC OHS: NEGATIVE
SPECIFIC GRAVITY, UA POC OHS: 1.02
UROBILINOGEN, UA POC OHS: 1

## 2023-03-10 PROCEDURE — 3008F PR BODY MASS INDEX (BMI) DOCUMENTED: ICD-10-PCS | Mod: CPTII,S$GLB,, | Performed by: NURSE PRACTITIONER

## 2023-03-10 PROCEDURE — 3008F BODY MASS INDEX DOCD: CPT | Mod: CPTII,S$GLB,, | Performed by: NURSE PRACTITIONER

## 2023-03-10 PROCEDURE — 99213 OFFICE O/P EST LOW 20 MIN: CPT | Mod: S$GLB,,, | Performed by: NURSE PRACTITIONER

## 2023-03-10 PROCEDURE — 99213 PR OFFICE/OUTPT VISIT, EST, LEVL III, 20-29 MIN: ICD-10-PCS | Mod: S$GLB,,, | Performed by: NURSE PRACTITIONER

## 2023-03-10 PROCEDURE — 3078F PR MOST RECENT DIASTOLIC BLOOD PRESSURE < 80 MM HG: ICD-10-PCS | Mod: CPTII,S$GLB,, | Performed by: NURSE PRACTITIONER

## 2023-03-10 PROCEDURE — 3075F PR MOST RECENT SYSTOLIC BLOOD PRESS GE 130-139MM HG: ICD-10-PCS | Mod: CPTII,S$GLB,, | Performed by: NURSE PRACTITIONER

## 2023-03-10 PROCEDURE — 3078F DIAST BP <80 MM HG: CPT | Mod: CPTII,S$GLB,, | Performed by: NURSE PRACTITIONER

## 2023-03-10 PROCEDURE — 1159F PR MEDICATION LIST DOCUMENTED IN MEDICAL RECORD: ICD-10-PCS | Mod: CPTII,S$GLB,, | Performed by: NURSE PRACTITIONER

## 2023-03-10 PROCEDURE — 1159F MED LIST DOCD IN RCRD: CPT | Mod: CPTII,S$GLB,, | Performed by: NURSE PRACTITIONER

## 2023-03-10 PROCEDURE — 3075F SYST BP GE 130 - 139MM HG: CPT | Mod: CPTII,S$GLB,, | Performed by: NURSE PRACTITIONER

## 2023-03-10 PROCEDURE — 1160F RVW MEDS BY RX/DR IN RCRD: CPT | Mod: CPTII,S$GLB,, | Performed by: NURSE PRACTITIONER

## 2023-03-10 PROCEDURE — 1160F PR REVIEW ALL MEDS BY PRESCRIBER/CLIN PHARMACIST DOCUMENTED: ICD-10-PCS | Mod: CPTII,S$GLB,, | Performed by: NURSE PRACTITIONER

## 2023-03-10 PROCEDURE — 81003 URINALYSIS AUTO W/O SCOPE: CPT | Mod: QW,S$GLB,, | Performed by: NURSE PRACTITIONER

## 2023-03-10 PROCEDURE — 81003 POCT URINALYSIS(INSTRUMENT): ICD-10-PCS | Mod: QW,S$GLB,, | Performed by: NURSE PRACTITIONER

## 2023-03-10 RX ORDER — KETOROLAC TROMETHAMINE 10 MG/1
10 TABLET, FILM COATED ORAL EVERY 6 HOURS PRN
Qty: 20 TABLET | Refills: 0 | Status: SHIPPED | OUTPATIENT
Start: 2023-03-10 | End: 2023-03-15

## 2023-03-10 NOTE — PROGRESS NOTES
Subjective:       Patient ID: Adriano Johnson is a 22 y.o. male.    Chief Complaint: Low-back Pain (Right side of back pt states stabbing pain occurred 3 am this morning.)    HPI woke up about 3 am with stabbing pain to right lower back. Lasted about 2-3 hours. Pain is much better. No trouble urinating. No diarrhea. No vomiting. Achy type discomfort now, movement does not make it worse. No medications taken.  He has had appendectomy and Lap Lupis. He has history of kidney stones. No fever. See ROS.    The following portion of the patients history was reviewed and updated as appropriate: allergies, current medications, past medical and surgical history. Past social history and problem list reviewed. Family PMH and Past social history reviewed. Tobacco, Illicit drug use reviewed.      Review of patient's allergies indicates:  No Known Allergies      Current Outpatient Medications:     ibuprofen (ADVIL,MOTRIN) 200 MG tablet, Take 200 mg by mouth every 6 (six) hours as needed for Pain., Disp: , Rfl:     ketorolac (TORADOL) 10 mg tablet, Take 1 tablet (10 mg total) by mouth every 6 (six) hours as needed for Pain., Disp: 20 tablet, Rfl: 0    prednisoLONE acetate (PRED FORTE) 1 % DrpS, Place 1 drop into the left eye 2 (two) times daily., Disp: , Rfl:     Past Medical History:   Diagnosis Date    Kidney stone     Symptomatic cholelithiasis 05/2019       Past Surgical History:   Procedure Laterality Date    APPENDECTOMY  02/20/2018    MD [facility]--[facility]     CHOLECYSTECTOMY      EYE SURGERY Left     LAPAROSCOPIC CHOLECYSTECTOMY N/A 05/27/2019    Procedure: CHOLECYSTECTOMY, LAPAROSCOPIC;  Surgeon: Jc Dobson MD;  Location: [facility];  Service: General;  Laterality: N/A;       Social History     Socioeconomic History    Marital status: Single   Tobacco Use    Smoking status: Never    Smokeless tobacco: Never   Substance and Sexual Activity    Alcohol use: No     Alcohol/week: 0.0 standard drinks    Drug use: No   Social History  "Narrative    Adopted in 2007 at age 7 (was fostered first)    Lives at home with both adopted parents, did have smoking exposure when younger (first 4 years)    No pets      Review of Systems   Constitutional:  Negative for activity change, appetite change, fatigue and fever.   Respiratory:  Negative for cough, chest tightness, shortness of breath and wheezing.    Cardiovascular:  Negative for chest pain, palpitations and leg swelling.   Gastrointestinal:  Negative for abdominal pain, blood in stool, diarrhea, nausea and vomiting.   Genitourinary:  Positive for flank pain. Negative for decreased urine volume, difficulty urinating, dysuria, frequency, hematuria, penile discharge, testicular pain and urgency.   Musculoskeletal:  Negative for arthralgias, back pain, gait problem and myalgias.   Skin:  Negative for rash.   Neurological:  Negative for dizziness, weakness and headaches.   Hematological:  Negative for adenopathy. Does not bruise/bleed easily.   Psychiatric/Behavioral:  Negative for dysphoric mood and sleep disturbance. The patient is not nervous/anxious.      Objective:      /78 (BP Location: Left arm, Patient Position: Sitting)   Pulse 83   Temp 97.3 °F (36.3 °C)   Resp 18   Ht 6' 1" (1.854 m)   Wt 85.5 kg (188 lb 6.1 oz)   SpO2 97%   BMI 24.85 kg/m²      Physical Exam  Constitutional:       Appearance: Normal appearance. He is well-developed and normal weight.   Eyes:      Conjunctiva/sclera: Conjunctivae normal.      Pupils: Pupils are equal, round, and reactive to light.   Neck:      Thyroid: No thyromegaly.      Vascular: No JVD.   Cardiovascular:      Rate and Rhythm: Normal rate and regular rhythm.      Pulses: Normal pulses.      Heart sounds: Normal heart sounds and S1 normal. No murmur heard.    No gallop.   Pulmonary:      Effort: Pulmonary effort is normal.      Breath sounds: Normal breath sounds. No decreased breath sounds or wheezing.   Abdominal:      General: Bowel sounds are " normal.      Palpations: Abdomen is soft.      Tenderness: There is no abdominal tenderness. There is no right CVA tenderness or left CVA tenderness.   Musculoskeletal:         General: Normal range of motion.      Cervical back: Normal range of motion and neck supple.      Lumbar back: No spasms or tenderness. Normal range of motion. Negative right straight leg raise test and negative left straight leg raise test.      Right lower leg: No edema.      Left lower leg: No edema.      Comments: Gait and coordination normal   Skin:     General: Skin is warm and dry.      Capillary Refill: Capillary refill takes less than 2 seconds.      Findings: No rash.   Neurological:      Mental Status: He is alert and oriented to person, place, and time.   Psychiatric:         Attention and Perception: Attention normal.         Mood and Affect: Mood and affect normal.         Speech: Speech normal.         Behavior: Behavior normal.       Assessment:       1. Flank pain    2. Hematuria, unspecified type    3. Renal calculi        Plan:       Flank pain: urine showed trace hematuria  -     POCT Urinalysis(Instrument)    Hematuria, unspecified type: trace. Hydrate well.     Renal calculi:  possible this is a kidney stone. He is passing urine fine. Toradol TID prn. Advised that if pain gets worse, if he cannot pass urine then go to ER. If not improved by Monday follow up.    Other orders  -     ketorolac (TORADOL) 10 mg tablet; Take 1 tablet (10 mg total) by mouth every 6 (six) hours as needed for Pain.  Dispense: 20 tablet; Refill: 0       Continue current medication  Take medications only as prescribed  Healthy diet, exercise  Adequate rest  Adequate hydration  Avoid allergens  Avoid excessive caffeine      Follow up if not improving

## 2023-03-10 NOTE — LETTER
March 10, 2023      [facility]  [address] [address] [address]  [facility] [address]  Phone: [phone]  Fax: [phone]       Patient: Adriano Johnson   YOB: 2000  Date of Visit: 03/10/2023    To Whom It May Concern:    Jose D Johnson  was at [facility] on 03/10/2023. The patient may return to work/school on 03/11/2023 with no restrictions. If you have any questions or concerns, or if I can be of further assistance, please do not hesitate to contact me.    Sincerely,    KELLY Brooks LPN